# Patient Record
Sex: FEMALE | Race: WHITE | NOT HISPANIC OR LATINO | Employment: FULL TIME | URBAN - NONMETROPOLITAN AREA
[De-identification: names, ages, dates, MRNs, and addresses within clinical notes are randomized per-mention and may not be internally consistent; named-entity substitution may affect disease eponyms.]

---

## 2020-05-18 ENCOUNTER — OFFICE VISIT (OUTPATIENT)
Dept: SURGERY | Facility: CLINIC | Age: 45
End: 2020-05-18

## 2020-05-18 VITALS
HEIGHT: 69 IN | OXYGEN SATURATION: 98 % | HEART RATE: 80 BPM | DIASTOLIC BLOOD PRESSURE: 68 MMHG | WEIGHT: 188 LBS | TEMPERATURE: 97.2 F | SYSTOLIC BLOOD PRESSURE: 120 MMHG | BODY MASS INDEX: 27.85 KG/M2

## 2020-05-18 DIAGNOSIS — R92.8 ABNORMAL FINDING ON BREAST IMAGING: Primary | ICD-10-CM

## 2020-05-18 PROCEDURE — 99244 OFF/OP CNSLTJ NEW/EST MOD 40: CPT | Performed by: SURGERY

## 2020-05-18 RX ORDER — LEVOTHYROXINE SODIUM 0.05 MG/1
50 TABLET ORAL DAILY
COMMUNITY
Start: 2020-04-23

## 2020-05-18 RX ORDER — SERTRALINE HYDROCHLORIDE 25 MG/1
TABLET, FILM COATED ORAL
COMMUNITY
Start: 2020-04-21

## 2020-05-18 RX ORDER — LISINOPRIL 10 MG/1
10 TABLET ORAL DAILY
COMMUNITY
Start: 2020-03-21

## 2020-05-18 NOTE — PROGRESS NOTES
Patient: Kristal Mackey    YOB: 1975    Date: 05/18/2020    Primary Care Provider: Kourtney Mc APRN    Chief Complaint   Patient presents with   • Breast Mass       Subjective .     History of present illness:  I saw the patient in the office  today for evaluation and treatment of a right breast mass. She had a recent mammogram that did show BIRADS 4. She denies pain and redness in the right breast. She complains of intermittent left breast pain. She had a paternal aunt with breast cancer.    She did have a recent mammogram performed at [facility] in [place] that showed a mild architectural distortion of the right breast at the 12 o'clock position next to the pectoralis muscle.    The following portions of the patient's history were reviewed and updated as appropriate: allergies, current medications, past family history, past medical history, past social history, past surgical history and problem list.    Review of Systems   Constitutional: Negative for chills, fever and unexpected weight change.   HENT: Negative for hearing loss, trouble swallowing and voice change.    Eyes: Negative for visual disturbance.   Respiratory: Negative for apnea, cough, chest tightness, shortness of breath and wheezing.    Cardiovascular: Negative for chest pain, palpitations and leg swelling.   Gastrointestinal: Negative for abdominal distention, abdominal pain, anal bleeding, blood in stool, constipation, diarrhea, nausea, rectal pain and vomiting.   Endocrine: Negative for cold intolerance and heat intolerance.   Genitourinary: Negative for difficulty urinating, dysuria and flank pain.   Musculoskeletal: Negative for back pain and gait problem.   Skin: Negative for color change, rash and wound.   Neurological: Negative for dizziness, syncope, speech difficulty, weakness, light-headedness, numbness and headaches.   Hematological: Negative for adenopathy. Does not bruise/bleed easily.   Psychiatric/Behavioral:  "Negative for confusion. The patient is not nervous/anxious.        History:  Past Medical History:   Diagnosis Date   • Hypertension           Past Surgical History:   Procedure Laterality Date   • CHOLECYSTECTOMY     • DILATATION AND CURETTAGE     • TONSILLECTOMY         Family History   Problem Relation Age of Onset   • Diabetes Mother    • Heart disease Father        Social History     Tobacco Use   • Smoking status: Current Every Day Smoker   • Smokeless tobacco: Never Used   Substance Use Topics   • Alcohol use: Defer   • Drug use: Defer       Allergies:  Allergies   Allergen Reactions   • Amoxicillin Rash       Medications:     Current Outpatient Medications:   •  levothyroxine (SYNTHROID, LEVOTHROID) 50 MCG tablet, , Disp: , Rfl:   •  lisinopril (PRINIVIL,ZESTRIL) 10 MG tablet, , Disp: , Rfl:   •  sertraline (ZOLOFT) 25 MG tablet, , Disp: , Rfl:     Objective     Vital Signs:   Vitals:    05/18/20 1103   BP: 120/68   Pulse: 80   Temp: 97.2 °F (36.2 °C)   SpO2: 98%   Weight: 85.3 kg (188 lb)   Height: 175.3 cm (69\")       Physical Exam:     General Appearance:    Alert, cooperative, in no acute distress   Head:    Normocephalic, without obvious abnormality, atraumatic   Eyes:            Lids and lashes normal, conjunctivae and sclerae normal, no   icterus, no pallor, corneas clear,   Ears:    Ears appear intact with no abnormalities noted   Throat:   No oral lesions, no thrush, oral mucosa moist   Breast:    No palpable masses in either breast   Lungs:     Clear to auscultation,respirations regular, even and                  Unlabored    Heart:    Regular rhythm and normal rate, no murmur, no gallop.   Chest Wall:    No abnormalities observed   Abdomen:     Normal bowel sounds, no masses, no organomegaly, soft        non-tender, non-distended, no guarding.   Extremities:   Moves all extremities well, no edema, no cyanosis, no             redness   Pulses:   Pulses palpable and equal bilaterally   Skin:   No " bleeding, bruising or rash   Lymph nodes:   No palpable adenopathy   Neurologic:   Cranial nerves 2 - 12 grossly intact.           Results Review:   I reviewed the patient's new clinical results.  I reviewed the patient's new imaging results and agree with the interpretation.  I reviewed the patient's other test results and agree with the interpretation    Review of Systems was reviewed and confirmed as accurate as documented by the MA.    Assessment / Plan:    1. Abnormal finding on breast imaging        I did have a detailed and extensive discussion with the patient in the office today and reviewed her recent workup.  I have told the patient that she needs to undergo wire-guided right breast biopsy.  She knows to get her old films from [facility] [place], we will schedule this accordingly.  Risk and benefits of operative versus nonoperative intervention have been discussed with the patient, she understands and agrees, and wishes to proceed.    Electronically signed by Wade Talbot MD  05/18/20  11:42      Portions of this note has been scribed for Wade Talbot MD by Cheyenne Duong. 5/18/2020  11:42

## 2020-05-22 ENCOUNTER — APPOINTMENT (OUTPATIENT)
Dept: PREADMISSION TESTING | Facility: HOSPITAL | Age: 45
End: 2020-05-22

## 2020-05-28 ENCOUNTER — HOSPITAL ENCOUNTER (OUTPATIENT)
Dept: MAMMOGRAPHY | Facility: HOSPITAL | Age: 45
End: 2020-05-28

## 2020-06-05 ENCOUNTER — APPOINTMENT (OUTPATIENT)
Dept: PREADMISSION TESTING | Facility: HOSPITAL | Age: 45
End: 2020-06-05

## 2020-06-05 ENCOUNTER — LAB (OUTPATIENT)
Dept: LAB | Facility: HOSPITAL | Age: 45
End: 2020-06-05

## 2020-06-05 VITALS
WEIGHT: 186.4 LBS | HEART RATE: 77 BPM | BODY MASS INDEX: 27.61 KG/M2 | SYSTOLIC BLOOD PRESSURE: 149 MMHG | HEIGHT: 69 IN | DIASTOLIC BLOOD PRESSURE: 86 MMHG | OXYGEN SATURATION: 98 %

## 2020-06-05 DIAGNOSIS — Z01.818 PRE-OP TESTING: Primary | ICD-10-CM

## 2020-06-05 DIAGNOSIS — Z01.818 PRE-OP TESTING: ICD-10-CM

## 2020-06-05 LAB
ANION GAP SERPL CALCULATED.3IONS-SCNC: 10.2 MMOL/L (ref 5–15)
B-HCG UR QL: NEGATIVE
BUN BLD-MCNC: 10 MG/DL (ref 6–20)
BUN/CREAT SERPL: 13 (ref 7–25)
CALCIUM SPEC-SCNC: 9.1 MG/DL (ref 8.6–10.5)
CHLORIDE SERPL-SCNC: 103 MMOL/L (ref 98–107)
CO2 SERPL-SCNC: 25.8 MMOL/L (ref 22–29)
CREAT BLD-MCNC: 0.77 MG/DL (ref 0.57–1)
DEPRECATED RDW RBC AUTO: 44.7 FL (ref 37–54)
ERYTHROCYTE [DISTWIDTH] IN BLOOD BY AUTOMATED COUNT: 13.2 % (ref 12.3–15.4)
GFR SERPL CREATININE-BSD FRML MDRD: 82 ML/MIN/1.73
GLUCOSE BLD-MCNC: 85 MG/DL (ref 65–99)
HCT VFR BLD AUTO: 45.8 % (ref 34–46.6)
HGB BLD-MCNC: 15.9 G/DL (ref 12–15.9)
MCH RBC QN AUTO: 32.4 PG (ref 26.6–33)
MCHC RBC AUTO-ENTMCNC: 34.7 G/DL (ref 31.5–35.7)
MCV RBC AUTO: 93.3 FL (ref 79–97)
PLATELET # BLD AUTO: 342 10*3/MM3 (ref 140–450)
PMV BLD AUTO: 9.7 FL (ref 6–12)
POTASSIUM BLD-SCNC: 3.9 MMOL/L (ref 3.5–5.2)
RBC # BLD AUTO: 4.91 10*6/MM3 (ref 3.77–5.28)
SODIUM BLD-SCNC: 139 MMOL/L (ref 136–145)
WBC NRBC COR # BLD: 14.05 10*3/MM3 (ref 3.4–10.8)

## 2020-06-05 PROCEDURE — C9803 HOPD COVID-19 SPEC COLLECT: HCPCS

## 2020-06-05 PROCEDURE — 36415 COLL VENOUS BLD VENIPUNCTURE: CPT

## 2020-06-05 PROCEDURE — 80048 BASIC METABOLIC PNL TOTAL CA: CPT | Performed by: SURGERY

## 2020-06-05 PROCEDURE — 93005 ELECTROCARDIOGRAM TRACING: CPT

## 2020-06-05 PROCEDURE — U0004 COV-19 TEST NON-CDC HGH THRU: HCPCS

## 2020-06-05 PROCEDURE — U0002 COVID-19 LAB TEST NON-CDC: HCPCS

## 2020-06-05 PROCEDURE — 81025 URINE PREGNANCY TEST: CPT | Performed by: SURGERY

## 2020-06-05 PROCEDURE — 85027 COMPLETE CBC AUTOMATED: CPT | Performed by: SURGERY

## 2020-06-06 LAB
REF LAB TEST METHOD: NORMAL
SARS-COV-2 RNA RESP QL NAA+PROBE: NOT DETECTED

## 2020-06-09 ENCOUNTER — HOSPITAL ENCOUNTER (OUTPATIENT)
Facility: HOSPITAL | Age: 45
Setting detail: HOSPITAL OUTPATIENT SURGERY
Discharge: HOME | End: 2020-06-09
Attending: SURGERY | Admitting: SURGERY

## 2020-06-09 ENCOUNTER — HOSPITAL ENCOUNTER (OUTPATIENT)
Dept: MAMMOGRAPHY | Facility: HOSPITAL | Age: 45
Discharge: HOME | End: 2020-06-09

## 2020-06-09 ENCOUNTER — ANESTHESIA EVENT (OUTPATIENT)
Dept: PERIOP | Facility: HOSPITAL | Age: 45
End: 2020-06-09

## 2020-06-09 ENCOUNTER — ANESTHESIA (OUTPATIENT)
Dept: PERIOP | Facility: HOSPITAL | Age: 45
End: 2020-06-09

## 2020-06-09 VITALS
TEMPERATURE: 97.5 F | DIASTOLIC BLOOD PRESSURE: 84 MMHG | HEART RATE: 67 BPM | RESPIRATION RATE: 18 BRPM | SYSTOLIC BLOOD PRESSURE: 116 MMHG | OXYGEN SATURATION: 98 %

## 2020-06-09 DIAGNOSIS — R92.8 ABNORMAL FINDING ON BREAST IMAGING: ICD-10-CM

## 2020-06-09 DIAGNOSIS — R92.8 ABNORMAL MAMMOGRAM OF RIGHT BREAST: ICD-10-CM

## 2020-06-09 PROCEDURE — G0279 TOMOSYNTHESIS, MAMMO: HCPCS

## 2020-06-09 PROCEDURE — 77065 DX MAMMO INCL CAD UNI: CPT

## 2020-06-09 PROCEDURE — 94799 UNLISTED PULMONARY SVC/PX: CPT

## 2020-06-09 PROCEDURE — 99214 OFFICE O/P EST MOD 30 MIN: CPT | Performed by: SURGERY

## 2020-06-09 RX ORDER — SCOLOPAMINE TRANSDERMAL SYSTEM 1 MG/1
1 PATCH, EXTENDED RELEASE TRANSDERMAL
Status: DISCONTINUED | OUTPATIENT
Start: 2020-06-09 | End: 2020-06-09 | Stop reason: HOSPADM

## 2020-06-09 RX ORDER — SODIUM CHLORIDE 0.9 % (FLUSH) 0.9 %
10 SYRINGE (ML) INJECTION AS NEEDED
Status: DISCONTINUED | OUTPATIENT
Start: 2020-06-09 | End: 2020-06-09 | Stop reason: HOSPADM

## 2020-06-09 RX ORDER — SODIUM CHLORIDE, SODIUM LACTATE, POTASSIUM CHLORIDE, CALCIUM CHLORIDE 600; 310; 30; 20 MG/100ML; MG/100ML; MG/100ML; MG/100ML
1000 INJECTION, SOLUTION INTRAVENOUS CONTINUOUS
Status: DISCONTINUED | OUTPATIENT
Start: 2020-06-09 | End: 2020-06-09 | Stop reason: HOSPADM

## 2020-06-09 RX ORDER — CLINDAMYCIN PHOSPHATE 900 MG/50ML
900 INJECTION, SOLUTION INTRAVENOUS ONCE
Status: DISCONTINUED | OUTPATIENT
Start: 2020-06-09 | End: 2020-06-09 | Stop reason: HOSPADM

## 2020-06-09 RX ADMIN — SODIUM CHLORIDE, POTASSIUM CHLORIDE, SODIUM LACTATE AND CALCIUM CHLORIDE 1000 ML: 600; 310; 30; 20 INJECTION, SOLUTION INTRAVENOUS at 07:44

## 2020-06-09 NOTE — H&P
Kristal Clintonle    1975    Primary Care Provider: Kourtney Mc APRN    No chief complaint on file.         SUBJECTIVE:    History of present illness: The patient is well-known to me and has had a previous bilateral mammogram performed in [place], [place] and this showed an abnormality in the right breast posteriorly.  She was scheduled today for wire-guided localization with right breast biopsy.    I have reviewed the mammogram performed today at our institution with our radiologist and this showed no evidence of abnormalities noted and no wire-guided breast biopsy will need to be performed.    Review of Systems:  Constitutional:  Negative for chills, fever, and unexpected weight change.  HENT: Negative for trouble swallowing and voice change.  Eyes:  Negative for visual disturbance.  Respiratory:  Negative for apnea, cough, chest tightness, shortness of breath, and wheezing.  Cardiovascular:  Negative for chest pain, palpitations, and leg swelling.  Gastrointestinal:  Negative for abdominal distention, abdominal pain, anal bleeding, blood in stool, constipation, diarrhea, nausea, rectal pain, and vomiting.  Musculoskeletal:  Negative for back pain, gait problem, and joint swelling.  Skin:  Negative for color change, rash, and wound  Neurological:  Negative for dizziness, syncope, speech difficulty, weakness, numbness, and headaches.  Hematological:  Negative for adenopathy.  Does not bruise/bleed easily.  Psychiatric/Behavioral:  Negative for confusion.  The patient is not nervous/anxious.        History:    Past Medical History:   Diagnosis Date   • Disease of thyroid gland    • Hypertension    • PONV (postoperative nausea and vomiting)        Past Surgical History:   Procedure Laterality Date   • CHOLECYSTECTOMY     • DILATATION AND CURETTAGE     • TONSILLECTOMY         Family History   Problem Relation Age of Onset   • Diabetes Mother    • Heart disease Father        Social History     Socioeconomic  History   • Marital status:      Spouse name: Not on file   • Number of children: Not on file   • Years of education: Not on file   • Highest education level: Not on file   Tobacco Use   • Smoking status: Current Every Day Smoker     Packs/day: 1.00     Years: 30.00     Pack years: 30.00     Types: Cigarettes   • Smokeless tobacco: Never Used   Substance and Sexual Activity   • Alcohol use: Yes     Alcohol/week: 1.0 standard drinks     Types: 1 Cans of beer per week   • Drug use: Defer   • Sexual activity: Defer       Allergies:  Allergies   Allergen Reactions   • Amoxicillin Rash       Medications:  No current facility-administered medications for this encounter.     Current Outpatient Medications:   •  levothyroxine (SYNTHROID, LEVOTHROID) 50 MCG tablet, Take 50 mcg by mouth Daily., Disp: , Rfl:   •  lisinopril (PRINIVIL,ZESTRIL) 10 MG tablet, Take 10 mg by mouth Daily., Disp: , Rfl:   •  sertraline (ZOLOFT) 25 MG tablet, , Disp: , Rfl:     OBJECTIVE:    Vital Signs:   Vitals:    06/09/20 0731   BP: 116/84   BP Location: Right arm   Patient Position: Sitting   Pulse: 67   Resp: 18   Temp: 97.5 °F (36.4 °C)   TempSrc: Temporal   SpO2: 98%       Physical Exam:   General Appearance:    Alert, cooperative, in no acute distress   Head:    Normocephalic, without obvious abnormality, atraumatic   Eyes:            Lids and lashes normal, conjunctivae and sclerae normal, no   icterus, no pallor, corneas clear, PERRLA   Throat:   No oral lesions, no thrush, oral mucosa moist   Neck:   No adenopathy, supple, trachea midline, no thyromegaly, no   carotid bruit, no JVD   Lungs:     Clear to auscultation,respirations regular, even and                  unlabored    Heart:    Regular rhythm and normal rate, normal S1 and S2, no            murmur, no gallop, no rub, no click   Chest Wall:    No abnormalities observed   Abdomen:     Normal bowel sounds, no masses, no organomegaly, soft        non-tender, non-distended, no  guarding, no rebound                tenderness   Extremities:   Moves all extremities well, no edema, no cyanosis, no             redness   Pulses:   Pulses palpable and equal bilaterally   Skin:   No bleeding, bruising or rash, bilateral breast examination was benign   Lymph nodes:   No palpable adenopathy   Neurologic:   Cranial nerves 2 - 12 grossly intact, sensation intact, DTR       present and equal bilaterally   Results Review:   I reviewed the patient's new clinical results.  I reviewed the patient's new imaging results and agree with the interpretation.  I reviewed the patient's other test results and agree with the interpretation    ASSESSMENT PLAN:    Patient Active Problem List   Diagnosis   • Abnormal finding on breast imaging       In short, I did have a detailed and extensive discussion with the patient and her  in the hospital today.  I reviewed her mammograms and discussed the situation with the radiologist.  She does not need to undergo wire-guided breast biopsy today but she does need to make sure to see me back in the office in 6 months with a repeat bilateral mammogram.  I will make sure my office schedules this accordingly so that the patient will follow-up with me.    I discussed the patients findings and my recommendations with patient and consulting provider    Wade Talbot MD  06/09/20  14:39

## 2020-06-09 NOTE — ANESTHESIA PREPROCEDURE EVALUATION
Anesthesia Evaluation     Patient summary reviewed and Nursing notes reviewed   history of anesthetic complications: PONV  NPO Solid Status: > 8 hours  NPO Liquid Status: > 8 hours           Airway   Mallampati: I  TM distance: >3 FB  Neck ROM: full  no difficulty expected  Dental - normal exam     Pulmonary - negative pulmonary ROS and normal exam   Cardiovascular - normal exam    (+) hypertension,       Neuro/Psych- negative ROS  GI/Hepatic/Renal/Endo - negative ROS     Musculoskeletal (-) negative ROS    Abdominal    Substance History - negative use     OB/GYN negative ob/gyn ROS         Other - negative ROS                       Anesthesia Plan    ASA 2     general     intravenous induction     Anesthetic plan, all risks, benefits, and alternatives have been provided, discussed and informed consent has been obtained with: patient.

## 2020-12-09 ENCOUNTER — TELEPHONE (OUTPATIENT)
Dept: SURGERY | Facility: CLINIC | Age: 45
End: 2020-12-09

## 2020-12-09 NOTE — TELEPHONE ENCOUNTER
Pt did not show for follow-up breast evaluation (pt did not have 6 month follow-up mammogram either), I called pt, left a message on voice mail asking her to return my call.

## 2022-07-18 ENCOUNTER — OFFICE VISIT (OUTPATIENT)
Dept: UROLOGY | Facility: CLINIC | Age: 47
End: 2022-07-18

## 2022-07-18 VITALS — BODY MASS INDEX: 35.55 KG/M2 | WEIGHT: 240 LBS | HEIGHT: 69 IN

## 2022-07-18 DIAGNOSIS — N20.0 STONE IN KIDNEY: Primary | ICD-10-CM

## 2022-07-18 LAB
BILIRUB BLD-MCNC: NEGATIVE MG/DL
CLARITY, POC: CLEAR
COLOR UR: YELLOW
EXPIRATION DATE: NORMAL
GLUCOSE UR STRIP-MCNC: NEGATIVE MG/DL
KETONES UR QL: NEGATIVE
LEUKOCYTE EST, POC: NEGATIVE
Lab: NORMAL
NITRITE UR-MCNC: NEGATIVE MG/ML
PH UR: 6 [PH] (ref 5–8)
PROT UR STRIP-MCNC: NEGATIVE MG/DL
RBC # UR STRIP: NEGATIVE /UL
SP GR UR: 1.01 (ref 1–1.03)
UROBILINOGEN UR QL: NORMAL

## 2022-07-18 PROCEDURE — 99202 OFFICE O/P NEW SF 15 MIN: CPT | Performed by: UROLOGY

## 2022-07-18 NOTE — PROGRESS NOTES
Office Note Kidney Stone      Patient Name: Kristal Morgan  : 1975   MRN: [id number]     Chief Complaint: History of Kidney Stones.    Chief Complaint   Patient presents with   • Kidney Stone       Referring Provider: Miguel Ángel Roberts MD    History of Present Illness: Kristal Morgan is a 46 y.o. female who presents today for history of Kidney Stones. Pt. Presented to [facility] roughly three weeks ago for left sided back pain radiating to the abdomen and groin. Pt. Was diagnosed with a 2mm left sided ureteral stone. Pt. Reports around the  she reported extreme bladder pain with gross hematuria. Pt. Believed she passed the stone then. Pt. Denies any fever, chills, nausea, vomiting, pain, or hematuria since the .     Prior stone prevention medications: None    Stone related history  Family history of stones:   yes  Renal disease or anatomic abnormality: yes  Malabsorptive disease or gastric bypass: no  Frequent UTI's    no  Parathyroid disease    no        Daily Fluid Intake  Pt. States she drinks 8oz of water daily along with 5-6 12oz. Cans of [employer].     Prior kidney stone surgeries? None      Subjective      Review of System: Review of Systems   Constitutional: Negative for chills, fatigue and fever.   Respiratory: Negative for cough, shortness of breath and stridor.    Cardiovascular: Negative for leg swelling.   Gastrointestinal: Positive for vomiting. Negative for abdominal pain and nausea.   Musculoskeletal: Positive for joint swelling. Negative for back pain.   Neurological: Negative for dizziness and headaches.   Psychiatric/Behavioral: Negative for confusion.      I have reviewed the ROS documented by my clinical staff, I have updated appropriately and I agree. Royce Garcia MD    Past Medical History:   Past Medical History:   Diagnosis Date   • Disease of thyroid gland    • Hypertension    • PONV (postoperative nausea and vomiting)        Past Surgical History:   Past  "Surgical History:   Procedure Laterality Date   • CHOLECYSTECTOMY     • DILATATION AND CURETTAGE     • TONSILLECTOMY         Family History:   Family History   Problem Relation Age of Onset   • Diabetes Mother    • Heart disease Father        Social History:   Social History     Socioeconomic History   • Marital status:    Tobacco Use   • Smoking status: Current Every Day Smoker     Packs/day: 1.00     Years: 30.00     Pack years: 30.00     Types: Cigarettes   • Smokeless tobacco: Never Used   Substance and Sexual Activity   • Alcohol use: Yes     Alcohol/week: 1.0 standard drink     Types: 1 Cans of beer per week   • Drug use: Defer   • Sexual activity: Defer       Medications:     Current Outpatient Medications:   •  levothyroxine (SYNTHROID, LEVOTHROID) 50 MCG tablet, Take 50 mcg by mouth Daily., Disp: , Rfl:   •  lisinopril (PRINIVIL,ZESTRIL) 10 MG tablet, Take 10 mg by mouth Daily., Disp: , Rfl:   •  sertraline (ZOLOFT) 25 MG tablet, , Disp: , Rfl:     Allergies:   Allergies   Allergen Reactions   • Amoxicillin Rash         Objective     Physical Exam:   Vital Signs:   Vitals:    07/18/22 1422   Weight: 109 kg (240 lb)   Height: 175.3 cm (69\")     Body mass index is 35.44 kg/m².     Physical Exam    Labs:   Brief Urine Lab Results  (Last result in the past 365 days)      Color   Clarity   Blood   Leuk Est   Nitrite   Protein   CREAT   Urine HCG        07/18/22 1425 Yellow   Clear   Negative   Negative   Negative   Negative                      Color   Date Value Ref Range Status   07/18/2022 Yellow Yellow, Straw, Dark Yellow, Kaylen Final     Bilirubin   Date Value Ref Range Status   07/18/2022 Negative Negative Final     Urobilinogen, UA   Date Value Ref Range Status   07/18/2022 Normal Normal Final     Blood, UA   Date Value Ref Range Status   07/18/2022 Negative Negative Final     Leukocytes   Date Value Ref Range Status   07/18/2022 Negative Negative Final       Lab Results   Component Value Date    " GLUCOSE 85 06/05/2020    CALCIUM 9.1 06/05/2020     06/05/2020    K 3.9 06/05/2020    CO2 25.8 06/05/2020     06/05/2020    BUN 10 06/05/2020    CREATININE 0.77 06/05/2020    EGFRIFNONA 82 06/05/2020    BCR 13.0 06/05/2020    ANIONGAP 10.2 06/05/2020       Lab Results   Component Value Date    WBC 14.05 (H) 06/05/2020    HGB 15.9 06/05/2020    HCT 45.8 06/05/2020    MCV 93.3 06/05/2020     06/05/2020       Stone Analysis  No components found for: HGZDB2AGWEST, [id number], TAEMJ0DCCIBR    Images:   No Images in the past 120 days found..    Measures:   Tobacco:   Kristal Morgan  reports that she has been smoking cigarettes. She has a 30.00 pack-year smoking history. She has never used smokeless tobacco.. I have educated her on the risk of diseases from using tobacco products such as cancer, COPD and heart disease.     I advised her to quit    I spent 3  minutes counseling the patient.           Urine Incontinence: Patient reports that she is not currently experiencing any symptoms of urinary incontinence.        Assessment / Plan      Assessment/Plan:   Mr. Morgan is a 46 y.o. male who presented today for kidney stones.  It seems it is likely that she has passed her stone and she has not had any further pain or discomfort.  She denies any dysuria or gross hematuria.  We will obtain a renal ultrasound to ensure she has passed her stone.  I will have her follow-up in 2 weeks.   Diagnoses and all orders for this visit:    1. Stone in kidney (Primary)  -     POC Urinalysis Dipstick, Automated  -     US Renal Bilateral; Future         Patient Education:   1. Fluid intake goal ~ 2.5L per day.  2.  Maintain a normal dietary calcium intake.  Take any calcium supplements with food.     Follow Up:   Return in about 2 weeks (around 8/1/2022) for Recheck.    I spent approximately 15 minutes providing clinical care for this patient; including review of patient's chart and provider documentation, face to  face time spent with patient in examination room (obtaining history, performing physical exam, discussing diagnosis and management options), placing orders, and completing patient documentation.     Royce Garcia MD  [facility]

## (undated) DEVICE — GLV SURG SENSICARE W/ALOE PF LF 8.5 STRL

## (undated) DEVICE — FLEXIBLE YANKAUER,MEDIUM TIP, NO VACUUM CONTROL: Brand: ARGYLE

## (undated) DEVICE — RICH MAJOR PROCEDURE: Brand: MEDLINE INDUSTRIES, INC.

## (undated) DEVICE — SUT VIC 3/0 SH 27IN J416H

## (undated) DEVICE — SUT VIC 3/0 TIES 18IN J110T

## (undated) DEVICE — SYR LL TP 10ML STRL

## (undated) DEVICE — DEV SPECI TRANSPEC W/PERF PLT

## (undated) DEVICE — DRSNG WND GZ PAD BORDERED LF 4X5IN STRL

## (undated) DEVICE — NDL HYPO ECLPS SFTY 25G 1 1/2IN

## (undated) DEVICE — STRIP,CLOSURE,WOUND,MEDI-STRIP,1/2X4: Brand: MEDLINE

## (undated) DEVICE — UNDYED BRAIDED (POLYGLACTIN 910), SYNTHETIC ABSORBABLE SUTURE: Brand: COATED VICRYL